# Patient Record
Sex: MALE | Race: WHITE
[De-identification: names, ages, dates, MRNs, and addresses within clinical notes are randomized per-mention and may not be internally consistent; named-entity substitution may affect disease eponyms.]

---

## 2021-04-11 ENCOUNTER — HOSPITAL ENCOUNTER (EMERGENCY)
Dept: HOSPITAL 49 - FER | Age: 61
Discharge: HOME | End: 2021-04-11
Payer: COMMERCIAL

## 2021-04-11 DIAGNOSIS — I10: ICD-10-CM

## 2021-04-11 DIAGNOSIS — M54.41: ICD-10-CM

## 2021-04-11 DIAGNOSIS — M54.42: Primary | ICD-10-CM

## 2021-04-11 DIAGNOSIS — F17.200: ICD-10-CM

## 2021-05-06 ENCOUNTER — HOSPITAL ENCOUNTER (OUTPATIENT)
Dept: HOSPITAL 49 - FAS | Age: 61
Discharge: HOME | End: 2021-05-06
Attending: SURGERY
Payer: COMMERCIAL

## 2021-05-06 VITALS — WEIGHT: 231.99 LBS | BODY MASS INDEX: 28.85 KG/M2 | HEIGHT: 75 IN

## 2021-05-06 DIAGNOSIS — K21.9: ICD-10-CM

## 2021-05-06 DIAGNOSIS — F17.210: ICD-10-CM

## 2021-05-06 DIAGNOSIS — Z79.899: ICD-10-CM

## 2021-05-06 DIAGNOSIS — M19.90: ICD-10-CM

## 2021-05-06 DIAGNOSIS — Z98.890: ICD-10-CM

## 2021-05-06 DIAGNOSIS — Z20.822: ICD-10-CM

## 2021-05-06 DIAGNOSIS — C79.49: Primary | ICD-10-CM

## 2021-05-06 DIAGNOSIS — Z82.49: ICD-10-CM

## 2021-05-06 DIAGNOSIS — I10: ICD-10-CM

## 2021-05-06 DIAGNOSIS — Z91.041: ICD-10-CM

## 2021-05-06 DIAGNOSIS — Z80.0: ICD-10-CM

## 2021-05-06 LAB
BASOPHIL: 0.3 % (ref 0–2)
EOSINOPHIL: 0.3 % (ref 0–5)
HCT: 39.9 % (ref 42–52)
HGB BLD-MCNC: 13.3 G/DL (ref 13.2–18)
INR PPP: 1.04 (ref 0.9–1.2)
LYMPHOCYTE: 12.7 % (ref 15–48)
MCH RBC QN AUTO: 30.5 PG (ref 25–31)
MCHC RBC AUTO-ENTMCNC: 33.3 G/DL (ref 32–36)
MCV: 91.5 FL (ref 78–100)
MONOCYTE: 9.5 % (ref 0–12)
MPV: 8.8 FL (ref 6–9.5)
NEUTROPHIL: 74.1 % (ref 41–80)
NRBC: 0.1
PLT: 417 K/UL (ref 150–400)
PROTHROMBIN TIME: 12.9 SECONDS (ref 11.4–13.6)
PTT: 26.4 SECONDS (ref 22.2–34.7)
RBC MORPHOLOGY: NORMAL
RBC: 4.36 M/UL (ref 4.7–6)
RDW: 13.8 % (ref 11.5–14)
WBC: 17.4 K/UL (ref 4–10.5)

## 2021-05-06 PROCEDURE — C1788 PORT, INDWELLING, IMP: HCPCS

## 2021-08-11 ENCOUNTER — HOSPITAL ENCOUNTER (INPATIENT)
Dept: HOSPITAL 49 - FER | Age: 61
LOS: 8 days | Discharge: NURSING, REHAB OR LONG-TERM CARE | DRG: 193 | End: 2021-08-19
Attending: INTERNAL MEDICINE | Admitting: INTERNAL MEDICINE
Payer: COMMERCIAL

## 2021-08-11 VITALS — HEIGHT: 75 IN | BODY MASS INDEX: 28.63 KG/M2 | WEIGHT: 230.25 LBS

## 2021-08-11 DIAGNOSIS — Z98.890: ICD-10-CM

## 2021-08-11 DIAGNOSIS — C78.7: ICD-10-CM

## 2021-08-11 DIAGNOSIS — K21.9: ICD-10-CM

## 2021-08-11 DIAGNOSIS — Z91.040: ICD-10-CM

## 2021-08-11 DIAGNOSIS — F03.90: ICD-10-CM

## 2021-08-11 DIAGNOSIS — I50.9: ICD-10-CM

## 2021-08-11 DIAGNOSIS — F17.200: ICD-10-CM

## 2021-08-11 DIAGNOSIS — Z90.81: ICD-10-CM

## 2021-08-11 DIAGNOSIS — F05: ICD-10-CM

## 2021-08-11 DIAGNOSIS — G93.41: ICD-10-CM

## 2021-08-11 DIAGNOSIS — E83.52: ICD-10-CM

## 2021-08-11 DIAGNOSIS — I11.0: ICD-10-CM

## 2021-08-11 DIAGNOSIS — C34.90: ICD-10-CM

## 2021-08-11 DIAGNOSIS — Z79.891: ICD-10-CM

## 2021-08-11 DIAGNOSIS — Z79.899: ICD-10-CM

## 2021-08-11 DIAGNOSIS — D64.9: ICD-10-CM

## 2021-08-11 DIAGNOSIS — E78.5: ICD-10-CM

## 2021-08-11 DIAGNOSIS — Z79.890: ICD-10-CM

## 2021-08-11 DIAGNOSIS — R18.0: ICD-10-CM

## 2021-08-11 DIAGNOSIS — Z66: ICD-10-CM

## 2021-08-11 DIAGNOSIS — Z20.822: ICD-10-CM

## 2021-08-11 DIAGNOSIS — J18.9: Primary | ICD-10-CM

## 2021-08-11 DIAGNOSIS — Z98.1: ICD-10-CM

## 2021-08-11 LAB
ALBUMIN SERPL-MCNC: 2.4 G/DL (ref 3.4–5)
ALKALINE PHOSHATASE: 742 U/L (ref 46–116)
ALT SERPL-CCNC: 38 U/L (ref 16–63)
AMORPH URATE CRY #/AREA URNS HPF: (no result) /[HPF]
AST: 147 U/L (ref 15–37)
BASOPHIL: 0.5 % (ref 0–2)
BILIRUBIN - TOTAL: 1.3 MG/DL (ref 0.2–1)
BILIRUBIN: (no result) MG/DL
BLOOD: NEGATIVE ERY/UL
BUN SERPL-MCNC: 11 MG/DL (ref 7–18)
BUN/CREAT RATIO (CALC): 19.3 RATIO
CHLORIDE: 96 MMOL/L (ref 98–107)
CLARITY UR: (no result)
CO2 (BICARBONATE): 27 MMOL/L (ref 21–32)
COLOR: YELLOW
CORONAVIRUS 2019 SARS-COV-2: NEGATIVE
CREATININE: 0.57 MG/DL (ref 0.67–1.17)
EOSINOPHIL: 0 % (ref 0–5)
GLOBULIN (CALCULATION): 3.8 G/DL
GLUCOSE (U): NORMAL MG/DL
GLUCOSE SERPL-MCNC: 78 MG/DL (ref 74–106)
GRAN CASTS #/AREA URNS LPF: (no result) /[LPF]
HCT: 26.9 % (ref 42–52)
HGB BLD-MCNC: 8.5 G/DL (ref 13.2–18)
INFLUENZA A NAA: NEGATIVE
LACTIC ACID: 1.7 MMOL/L (ref 0.4–1.9)
LEUKOCYTES: NEGATIVE LEU/UL
LYMPHOCYTE: 6.7 % (ref 15–48)
MCH RBC QN AUTO: 29.5 PG (ref 25–31)
MCHC RBC AUTO-ENTMCNC: 31.6 G/DL (ref 32–36)
MCV: 93.4 FL (ref 78–100)
MONOCYTE: 14.7 % (ref 0–12)
MPV: 8.8 FL (ref 6–9.5)
NEUTROPHIL: 67.3 % (ref 41–80)
NITRITE: NEGATIVE MG/DL
NRBC: 1
PLT: 398 K/UL (ref 150–400)
POTASSIUM: 4 MMOL/L (ref 3.5–5.1)
PROTEIN: (no result) MG/DL
RBC MORPHOLOGY: (no result)
RBC: 2.88 M/UL (ref 4.7–6)
RDW: 24.2 % (ref 11.5–14)
SPECIFIC GRAVITY: 1.02 (ref 1–1.03)
TOTAL PROTEIN: 6.2 G/DL (ref 6.4–8.2)
TRIPLE PHOSPHATE CRYSTALS: (no result)
URINARY RBC: (no result)
UROBILINOGEN: 1 MG/DL (ref 0.2–1)
WBC: 20.2 K/UL (ref 4–10.5)

## 2021-08-11 PROCEDURE — U0002 COVID-19 LAB TEST NON-CDC: HCPCS

## 2021-08-12 LAB
BASOPHIL: 0.5 % (ref 0–2)
BUN SERPL-MCNC: 15 MG/DL (ref 7–18)
BUN/CREAT RATIO (CALC): 23.1 RATIO
CHLORIDE: 97 MMOL/L (ref 98–107)
CO2 (BICARBONATE): 28 MMOL/L (ref 21–32)
CREATININE: 0.65 MG/DL (ref 0.67–1.17)
EOSINOPHIL: 0 % (ref 0–5)
FOLIC ACID (SERUM): 6.6 NG/ML (ref 8.6–58.9)
GLUCOSE SERPL-MCNC: 62 MG/DL (ref 74–106)
HCT: 28 % (ref 42–52)
HGB BLD-MCNC: 8.8 G/DL (ref 13.2–18)
IRON % SATURATION: 43.7 %SAT (ref 20–50)
IRON SERPL-MCNC: 83 UG/DL (ref 65–175)
LYMPHOCYTE: 6.5 % (ref 15–48)
MCH RBC QN AUTO: 29.7 PG (ref 25–31)
MCHC RBC AUTO-ENTMCNC: 31.4 G/DL (ref 32–36)
MCV: 94.6 FL (ref 78–100)
MONOCYTE: 13.3 % (ref 0–12)
MPV: 8.9 FL (ref 6–9.5)
NEUTROPHIL: 69.3 % (ref 41–80)
NRBC: 1.3
PLT: 375 K/UL (ref 150–400)
POTASSIUM: 3.6 MMOL/L (ref 3.5–5.1)
RBC MORPHOLOGY: (no result)
RBC: 2.96 M/UL (ref 4.7–6)
RDW: 24.4 % (ref 11.5–14)
RETICULOCYTE COUNT: 2.7 % (ref 1–2)
WBC: 20.2 K/UL (ref 4–10.5)

## 2021-08-12 NOTE — NUR
8/12/21 Mr. Leone lives at home with his spouse. They do not have children
nor any family that live locally. [facility]. He is currently receiving short term disability from [facility] and will move to long term disability in October. - Family has been
advised to discuss eligibility for SSD with the [employer]. - Mr. Leone will need a
rw, 3in1, and likely 02 at discharge as well as HH. They chose [facility],
affliation undertood. A referral was made to [facility].

## 2021-08-13 LAB
ALBUMIN SERPL-MCNC: 2.3 G/DL (ref 3.4–5)
ALKALINE PHOSHATASE: 710 U/L (ref 46–116)
ALT SERPL-CCNC: 47 U/L (ref 16–63)
AST: 180 U/L (ref 15–37)
BASOPHIL: 0.3 % (ref 0–2)
BILIRUBIN - TOTAL: 1.4 MG/DL (ref 0.2–1)
BUN SERPL-MCNC: 16 MG/DL (ref 7–18)
BUN/CREAT RATIO (CALC): 27.1 RATIO
CHLORIDE: 97 MMOL/L (ref 98–107)
CO2 (BICARBONATE): 30 MMOL/L (ref 21–32)
CREATININE: 0.59 MG/DL (ref 0.67–1.17)
EOSINOPHIL: 0 % (ref 0–5)
GLOBULIN (CALCULATION): 3.7 G/DL
GLUCOSE SERPL-MCNC: 78 MG/DL (ref 74–106)
HCT: 26.5 % (ref 42–52)
HGB BLD-MCNC: 8.2 G/DL (ref 13.2–18)
LYMPHOCYTE: 7.1 % (ref 15–48)
MCH RBC QN AUTO: 29.1 PG (ref 25–31)
MCHC RBC AUTO-ENTMCNC: 30.9 G/DL (ref 32–36)
MCV: 94 FL (ref 78–100)
MONOCYTE: 11.1 % (ref 0–12)
MPV: 9.4 FL (ref 6–9.5)
NEUTROPHIL: 72.1 % (ref 41–80)
NRBC: 1.6
PLT: 373 K/UL (ref 150–400)
POTASSIUM: 3.5 MMOL/L (ref 3.5–5.1)
RBC MORPHOLOGY: (no result)
RBC: 2.82 M/UL (ref 4.7–6)
RDW: 24.5 % (ref 11.5–14)
TOTAL PROTEIN: 6 G/DL (ref 6.4–8.2)
WBC: 21.4 K/UL (ref 4–10.5)

## 2021-08-14 LAB
ALBUMIN SERPL-MCNC: 2.2 G/DL (ref 3.4–5)
ALKALINE PHOSHATASE: 698 U/L (ref 46–116)
ALT SERPL-CCNC: 51 U/L (ref 16–63)
AST: 204 U/L (ref 15–37)
BASOPHIL: 0.3 % (ref 0–2)
BILIRUBIN - TOTAL: 1.4 MG/DL (ref 0.2–1)
BUN SERPL-MCNC: 20 MG/DL (ref 7–18)
BUN/CREAT RATIO (CALC): 27.8 RATIO
CHLORIDE: 101 MMOL/L (ref 98–107)
CO2 (BICARBONATE): 25 MMOL/L (ref 21–32)
CREATININE: 0.72 MG/DL (ref 0.67–1.17)
EOSINOPHIL: 0 % (ref 0–5)
FT4 (FREE T4): 1.4 NG/DL (ref 0.76–1.46)
GLOBULIN (CALCULATION): 3.1 G/DL
GLUCOSE SERPL-MCNC: 72 MG/DL (ref 74–106)
HCT: 26.2 % (ref 42–52)
HGB BLD-MCNC: 8.2 G/DL (ref 13.2–18)
LYMPHOCYTE: 5.1 % (ref 15–48)
MCH RBC QN AUTO: 29.7 PG (ref 25–31)
MCHC RBC AUTO-ENTMCNC: 31.3 G/DL (ref 32–36)
MCV: 94.9 FL (ref 78–100)
MONOCYTE: 9.1 % (ref 0–12)
MPV: 8.9 FL (ref 6–9.5)
NEUTROPHIL: 77.3 % (ref 41–80)
NRBC: 1.8
PLT: 321 K/UL (ref 150–400)
POTASSIUM: 3.3 MMOL/L (ref 3.5–5.1)
RBC MORPHOLOGY: (no result)
RBC: 2.76 M/UL (ref 4.7–6)
RDW: 25.3 % (ref 11.5–14)
TOTAL PROTEIN: 5.3 G/DL (ref 6.4–8.2)
URIC ACID: 9.7 MG/DL (ref 3.5–7.2)
WBC: 23.3 K/UL (ref 4–10.5)

## 2021-08-14 NOTE — NUR
8/13/21  2210 PT FOUND WITH O2 OFF AND ALL LEADS FOR TELE OFF AND PORT A CATH
WAS DEACCESSED PER PT AND FOUND IN BED O2 SAT 88 CALLED HOUSESUPERVISOR
FOR NEED OF 1 TO 1 SITTER FOR SAFETY ALL MEDICAL EQUIPMENT REAPPIED AND PORT
A CATH REACCESSED WITH GREAT BLOOD RETURNED PT WILL HAVE 1 TO 1 CARE AT 2330

## 2021-08-15 LAB
ALBUMIN SERPL-MCNC: 2.1 G/DL (ref 3.4–5)
ALKALINE PHOSHATASE: 667 U/L (ref 46–116)
ALT SERPL-CCNC: 59 U/L (ref 16–63)
AST: 252 U/L (ref 15–37)
BASOPHIL: 0.3 % (ref 0–2)
BILIRUBIN - TOTAL: 1.3 MG/DL (ref 0.2–1)
BUN SERPL-MCNC: 21 MG/DL (ref 7–18)
BUN/CREAT RATIO (CALC): 31.3 RATIO
CHLORIDE: 100 MMOL/L (ref 98–107)
CO2 (BICARBONATE): 28 MMOL/L (ref 21–32)
CREATININE: 0.67 MG/DL (ref 0.67–1.17)
EOSINOPHIL: 0 % (ref 0–5)
GLOBULIN (CALCULATION): 3.5 G/DL
GLUCOSE SERPL-MCNC: 74 MG/DL (ref 74–106)
HCT: 26.1 % (ref 42–52)
HGB BLD-MCNC: 8.2 G/DL (ref 13.2–18)
LYMPHOCYTE: 4.6 % (ref 15–48)
MCH RBC QN AUTO: 29.7 PG (ref 25–31)
MCHC RBC AUTO-ENTMCNC: 31.4 G/DL (ref 32–36)
MCV: 94.6 FL (ref 78–100)
MONOCYTE: 5.8 % (ref 0–12)
MPV: 8.5 FL (ref 6–9.5)
NEUTROPHIL: 81.7 % (ref 41–80)
NRBC: 3
PLT: 266 K/UL (ref 150–400)
POTASSIUM: 3.1 MMOL/L (ref 3.5–5.1)
RBC MORPHOLOGY: (no result)
RBC: 2.76 M/UL (ref 4.7–6)
RDW: 25.3 % (ref 11.5–14)
TOTAL PROTEIN: 5.6 G/DL (ref 6.4–8.2)
WBC: 23.6 K/UL (ref 4–10.5)

## 2021-08-16 LAB
ALBUMIN SERPL-MCNC: 2.1 G/DL (ref 3.4–5)
ALKALINE PHOSHATASE: 764 U/L (ref 46–116)
ALT SERPL-CCNC: 56 U/L (ref 16–63)
AST: 321 U/L (ref 15–37)
BASOPHIL: 0.3 % (ref 0–2)
BILIRUBIN - TOTAL: 1.1 MG/DL (ref 0.2–1)
BUN SERPL-MCNC: 18 MG/DL (ref 7–18)
BUN/CREAT RATIO (CALC): 30.5 RATIO
CHLORIDE: 99 MMOL/L (ref 98–107)
CO2 (BICARBONATE): 28 MMOL/L (ref 21–32)
CREATININE: 0.59 MG/DL (ref 0.67–1.17)
EOSINOPHIL: 0 % (ref 0–5)
GLOBULIN (CALCULATION): 3.5 G/DL
GLUCOSE SERPL-MCNC: 83 MG/DL (ref 74–106)
HCT: 26.6 % (ref 42–52)
HGB BLD-MCNC: 8.3 G/DL (ref 13.2–18)
LYMPHOCYTE: 4.1 % (ref 15–48)
MCH RBC QN AUTO: 30 PG (ref 25–31)
MCHC RBC AUTO-ENTMCNC: 31.2 G/DL (ref 32–36)
MCV: 96 FL (ref 78–100)
MONOCYTE: 5.6 % (ref 0–12)
MPV: 9.2 FL (ref 6–9.5)
NEUTROPHIL: 82.9 % (ref 41–80)
NRBC: 2.8
PLT: 261 K/UL (ref 150–400)
POTASSIUM: 3.3 MMOL/L (ref 3.5–5.1)
RBC MORPHOLOGY: (no result)
RBC: 2.77 M/UL (ref 4.7–6)
RDW: 25.7 % (ref 11.5–14)
TOTAL PROTEIN: 5.6 G/DL (ref 6.4–8.2)
WBC: 24 K/UL (ref 4–10.5)

## 2021-08-16 NOTE — NUR
8/16/21 Mr. Leone is nearing being ready for discharge. His spouse chose
[facility], [facility]. Referrals have been sent to
each facility.

## 2021-08-17 LAB
ALBUMIN SERPL-MCNC: 2.1 G/DL (ref 3.4–5)
ALKALINE PHOSHATASE: 860 U/L (ref 46–116)
ALT SERPL-CCNC: 106 U/L (ref 16–63)
AST: 525 U/L (ref 15–37)
BASOPHIL: 0.4 % (ref 0–2)
BILIRUBIN - TOTAL: 2.1 MG/DL (ref 0.2–1)
BUN SERPL-MCNC: 20 MG/DL (ref 7–18)
BUN/CREAT RATIO (CALC): 35.7 RATIO
CHLORIDE: 99 MMOL/L (ref 98–107)
CO2 (BICARBONATE): 24 MMOL/L (ref 21–32)
CREATININE: 0.56 MG/DL (ref 0.67–1.17)
EOSINOPHIL: 0.1 % (ref 0–5)
GLOBULIN (CALCULATION): 3.5 G/DL
GLUCOSE SERPL-MCNC: 64 MG/DL (ref 74–106)
HCT: 26.5 % (ref 42–52)
HGB BLD-MCNC: 8.4 G/DL (ref 13.2–18)
LYMPHOCYTE: 4.2 % (ref 15–48)
MCH RBC QN AUTO: 30.2 PG (ref 25–31)
MCHC RBC AUTO-ENTMCNC: 31.7 G/DL (ref 32–36)
MCV: 95.3 FL (ref 78–100)
MONOCYTE: 6.8 % (ref 0–12)
MPV: 9 FL (ref 6–9.5)
NEUTROPHIL: 80.3 % (ref 41–80)
NRBC: 2.9
PLT: 230 K/UL (ref 150–400)
POTASSIUM: 4 MMOL/L (ref 3.5–5.1)
RBC MORPHOLOGY: NORMAL
RBC: 2.78 M/UL (ref 4.7–6)
RDW: 26.2 % (ref 11.5–14)
TOTAL PROTEIN: 5.6 G/DL (ref 6.4–8.2)
WBC: 22.4 K/UL (ref 4–10.5)

## 2021-08-18 NOTE — NUR
8/18/21 [facility] has accepted Mr. Leone pending insurance approval. Report
given to MS DARYN Woods.

## 2021-08-19 ENCOUNTER — HOSPITAL ENCOUNTER (INPATIENT)
Dept: HOSPITAL 49 - FER | Age: 61
LOS: 7 days | DRG: 441 | End: 2021-08-26
Attending: INTERNAL MEDICINE | Admitting: INTERNAL MEDICINE
Payer: COMMERCIAL

## 2021-08-19 VITALS — WEIGHT: 251.12 LBS | HEIGHT: 75 IN | BODY MASS INDEX: 31.22 KG/M2

## 2021-08-19 DIAGNOSIS — J96.01: ICD-10-CM

## 2021-08-19 DIAGNOSIS — Z20.822: ICD-10-CM

## 2021-08-19 DIAGNOSIS — Z87.01: ICD-10-CM

## 2021-08-19 DIAGNOSIS — Z51.5: ICD-10-CM

## 2021-08-19 DIAGNOSIS — K72.90: Primary | ICD-10-CM

## 2021-08-19 DIAGNOSIS — Z90.81: ICD-10-CM

## 2021-08-19 DIAGNOSIS — D63.8: ICD-10-CM

## 2021-08-19 DIAGNOSIS — Z66: ICD-10-CM

## 2021-08-19 DIAGNOSIS — I11.0: ICD-10-CM

## 2021-08-19 DIAGNOSIS — Z92.21: ICD-10-CM

## 2021-08-19 DIAGNOSIS — C34.90: ICD-10-CM

## 2021-08-19 DIAGNOSIS — R18.8: ICD-10-CM

## 2021-08-19 DIAGNOSIS — I47.2: ICD-10-CM

## 2021-08-19 DIAGNOSIS — Z87.891: ICD-10-CM

## 2021-08-19 DIAGNOSIS — Z79.82: ICD-10-CM

## 2021-08-19 DIAGNOSIS — I50.33: ICD-10-CM

## 2021-08-19 DIAGNOSIS — Z79.51: ICD-10-CM

## 2021-08-19 DIAGNOSIS — J18.9: ICD-10-CM

## 2021-08-19 DIAGNOSIS — Z92.3: ICD-10-CM

## 2021-08-19 DIAGNOSIS — C78.7: ICD-10-CM

## 2021-08-19 DIAGNOSIS — Z79.899: ICD-10-CM

## 2021-08-19 DIAGNOSIS — R33.9: ICD-10-CM

## 2021-08-19 DIAGNOSIS — G89.29: ICD-10-CM

## 2021-08-19 DIAGNOSIS — G93.41: ICD-10-CM

## 2021-08-19 DIAGNOSIS — K56.7: ICD-10-CM

## 2021-08-19 DIAGNOSIS — Z91.041: ICD-10-CM

## 2021-08-19 DIAGNOSIS — Z98.1: ICD-10-CM

## 2021-08-19 DIAGNOSIS — Z79.891: ICD-10-CM

## 2021-08-19 DIAGNOSIS — E78.5: ICD-10-CM

## 2021-08-19 DIAGNOSIS — M84.48XA: ICD-10-CM

## 2021-08-19 DIAGNOSIS — Z88.8: ICD-10-CM

## 2021-08-19 LAB
BASOPHIL: 0.4 % (ref 0–2)
BUN SERPL-MCNC: 19 MG/DL (ref 7–18)
BUN/CREAT RATIO (CALC): 34.5 RATIO
CHLORIDE: 102 MMOL/L (ref 98–107)
CO2 (BICARBONATE): 26 MMOL/L (ref 21–32)
CREATININE: 0.55 MG/DL (ref 0.67–1.17)
EOSINOPHIL: 0 % (ref 0–5)
GLUCOSE SERPL-MCNC: 85 MG/DL (ref 74–106)
HCT: 25.3 % (ref 42–52)
HGB BLD-MCNC: 8.2 G/DL (ref 13.2–18)
LYMPHOCYTE: 5.4 % (ref 15–48)
MCH RBC QN AUTO: 30.3 PG (ref 25–31)
MCHC RBC AUTO-ENTMCNC: 32.4 G/DL (ref 32–36)
MCV: 93.4 FL (ref 78–100)
MONOCYTE: 8.3 % (ref 0–12)
MPV: 8.9 FL (ref 6–9.5)
NEUTROPHIL: 74.8 % (ref 41–80)
NRBC: 5.5
PLT: 210 K/UL (ref 150–400)
POTASSIUM: 4.3 MMOL/L (ref 3.5–5.1)
RBC MORPHOLOGY: (no result)
RBC: 2.71 M/UL (ref 4.7–6)
RDW: 26.5 % (ref 11.5–14)
WBC: 24.3 K/UL (ref 4–10.5)

## 2021-08-19 PROCEDURE — P9046 ALBUMIN (HUMAN), 25%, 20 ML: HCPCS

## 2021-08-19 PROCEDURE — C9113 INJ PANTOPRAZOLE SODIUM, VIA: HCPCS

## 2021-08-19 PROCEDURE — U0002 COVID-19 LAB TEST NON-CDC: HCPCS

## 2021-08-19 NOTE — NUR
8/19/21 [facility] has accept Mr. Leone for dmission today. Patient meets
criteria for EMS transport per Dr. Patton. Report given to Dora, MS Charge RN.

## 2021-08-20 LAB
ALBUMIN SERPL-MCNC: 1.9 G/DL (ref 3.4–5)
ALKALINE PHOSHATASE: 744 U/L (ref 46–116)
ALT SERPL-CCNC: 100 U/L (ref 16–63)
AMORPHOUS PHOSPHATE CRYSTALS: (no result)
AST: 486 U/L (ref 15–37)
BASOPHIL: 0.6 % (ref 0–2)
BILIRUBIN - TOTAL: 2.2 MG/DL (ref 0.2–1)
BILIRUBIN: (no result) MG/DL
BLOOD: NEGATIVE ERY/UL
BUN SERPL-MCNC: 21 MG/DL (ref 7–18)
BUN/CREAT RATIO (CALC): 35 RATIO
CHLORIDE: 101 MMOL/L (ref 98–107)
CLARITY UR: (no result)
CO2 (BICARBONATE): 26 MMOL/L (ref 21–32)
COLOR: YELLOW
CREATININE: 0.6 MG/DL (ref 0.67–1.17)
EOSINOPHIL: 0 % (ref 0–5)
GLOBULIN (CALCULATION): 3.7 G/DL
GLUCOSE (U): NORMAL MG/DL
GLUCOSE SERPL-MCNC: 91 MG/DL (ref 74–106)
GRAN CASTS #/AREA URNS LPF: (no result) /[LPF]
HCT: 26 % (ref 42–52)
HGB BLD-MCNC: 8.5 G/DL (ref 13.2–18)
LACTIC ACID: 2.8 MMOL/L (ref 0.4–1.9)
LEUKOCYTES: NEGATIVE LEU/UL
LYMPHOCYTE: 5.5 % (ref 15–48)
MCH RBC QN AUTO: 30 PG (ref 25–31)
MCHC RBC AUTO-ENTMCNC: 32.7 G/DL (ref 32–36)
MCV: 91.9 FL (ref 78–100)
MONOCYTE: 8.1 % (ref 0–12)
MPV: 9.6 FL (ref 6–9.5)
NEUTROPHIL: 70.9 % (ref 41–80)
NITRITE: NEGATIVE MG/DL
NRBC: 7.5
PLT: 213 K/UL (ref 150–400)
POTASSIUM: 4.2 MMOL/L (ref 3.5–5.1)
PROTEIN: (no result) MG/DL
RBC MORPHOLOGY: (no result)
RBC: 2.83 M/UL (ref 4.7–6)
RDW: 26.5 % (ref 11.5–14)
SPECIFIC GRAVITY: 1.01 (ref 1–1.03)
SQUAMOUS EPITHELIAL CELLS: (no result)
TOTAL PROTEIN: 5.6 G/DL (ref 6.4–8.2)
URINARY RBC: (no result)
URINARY WBC: (no result)
UROBILINOGEN: 0.2 MG/DL (ref 0.2–1)
WBC: 25.1 K/UL (ref 4–10.5)

## 2021-08-20 NOTE — NUR
PT HAD A 26 OR MORE BEAT OF VTACH AT 1702 HEART RATE GOT AS HIGH  BEATS
PER MINUTE SHOWN ON MONITOR
DR SANTIAGO CAME INTO TH ROOM AND ASSESSED PATIENT DID A CAROTID MASSAGE
5MG OF IV LOPRESSOR WAS GIVEN BY GEOFF STEARNS
PT RETURNED TO NORMAL SINUS RHYTHM AT 91 BEATS PER MINUTE

## 2021-08-20 NOTE — NUR
8/20/21 Mr. Leone was discharged to [facility] on 8/19/21. Insurance approved
patient's admission at [facility] through 8/25. [facility] will consider return
after clinical approval.

## 2021-08-21 LAB
ALBUMIN SERPL-MCNC: 1.8 G/DL (ref 3.4–5)
ALKALINE PHOSHATASE: 683 U/L (ref 46–116)
ALT SERPL-CCNC: 88 U/L (ref 16–63)
AST: 404 U/L (ref 15–37)
BASOPHIL: 0.1 % (ref 0–2)
BILIRUBIN - TOTAL: 2.4 MG/DL (ref 0.2–1)
BUN SERPL-MCNC: 20 MG/DL (ref 7–18)
BUN/CREAT RATIO (CALC): 28.6 RATIO
CHLORIDE: 104 MMOL/L (ref 98–107)
CO2 (BICARBONATE): 28 MMOL/L (ref 21–32)
CREATININE: 0.7 MG/DL (ref 0.67–1.17)
EOSINOPHIL: 0.1 % (ref 0–5)
GLOBULIN (CALCULATION): 3.7 G/DL
GLUCOSE SERPL-MCNC: 75 MG/DL (ref 74–106)
HCT: 25.9 % (ref 42–52)
HGB BLD-MCNC: 8.3 G/DL (ref 13.2–18)
LYMPHOCYTE: 3 % (ref 15–48)
MAGNESIUM SERPL-MCNC: 2.1 MG/DL (ref 1.8–2.4)
MCH RBC QN AUTO: 30.2 PG (ref 25–31)
MCHC RBC AUTO-ENTMCNC: 32 G/DL (ref 32–36)
MCV: 94.2 FL (ref 78–100)
MONOCYTE: 8.8 % (ref 0–12)
MPV: 9.1 FL (ref 6–9.5)
NEUTROPHIL: 70.7 % (ref 41–80)
NRBC: 10.9
PLT: 199 K/UL (ref 150–400)
POTASSIUM: 3.9 MMOL/L (ref 3.5–5.1)
RBC MORPHOLOGY: NORMAL
RBC: 2.75 M/UL (ref 4.7–6)
RDW: 27.5 % (ref 11.5–14)
TOTAL PROTEIN: 5.5 G/DL (ref 6.4–8.2)
WBC: 24.7 K/UL (ref 4–10.5)

## 2021-08-21 NOTE — NUR
PT HR WENT UP  AND SUSTAINED FOR A MINUTE. APRN NOTIFIED AND EKG WAS
GOTTEN BUT PT HAD ALREADY COME OUT OF RHYTHM. APRN AT BEDSIDE AT THIS TIME. NO
NEW ORDERS. NIGHTLY MEDICATIONS WERE GIVEN

## 2021-08-22 LAB
ALBUMIN SERPL-MCNC: 1.8 G/DL (ref 3.4–5)
ALKALINE PHOSHATASE: 663 U/L (ref 46–116)
ALT SERPL-CCNC: 80 U/L (ref 16–63)
AST: 404 U/L (ref 15–37)
BASOPHIL: 0.1 % (ref 0–2)
BILIRUBIN - TOTAL: 3 MG/DL (ref 0.2–1)
BUN SERPL-MCNC: 25 MG/DL (ref 7–18)
BUN/CREAT RATIO (CALC): 29.4 RATIO
CHLORIDE: 104 MMOL/L (ref 98–107)
CO2 (BICARBONATE): 27 MMOL/L (ref 21–32)
CREATININE: 0.85 MG/DL (ref 0.67–1.17)
EOSINOPHIL: 0.2 & (ref 0–5)
GLOBULIN (CALCULATION): 2.9 G/DL
GLUCOSE SERPL-MCNC: 78 MG/DL (ref 74–106)
HCT: 23.8 % (ref 42–52)
HGB BLD-MCNC: 7.9 G/DL (ref 13.2–18)
INR PPP: 1.33 (ref 0.9–1.2)
LYMPHOCYTE: 19 % (ref 15–48)
MCH RBC QN AUTO: 30.7 PG (ref 25–31)
MCHC RBC AUTO-ENTMCNC: 33.2 G/DL (ref 32–36)
MCV: 92.6 FL (ref 78–100)
MONOCYTE: 7.5 % (ref 0–12)
MPV: 9.5 FL (ref 6–9.5)
NEUTROPHIL: 57.3 % (ref 41–80)
PLT: 185 K/UL (ref 150–400)
POTASSIUM: 3.3 MMOL/L (ref 3.5–5.1)
PROTHROMBIN TIME: 15.8 SECONDS (ref 11.8–13.4)
RBC MORPHOLOGY: NORMAL
RBC: 2.57 M/UL (ref 4.7–6)
RDW: 26.9 % (ref 11.5–14)
TOTAL PROTEIN: 4.7 G/DL (ref 6.4–8.2)
WBC: 26.56 K/UL (ref 4–10.5)

## 2021-08-22 NOTE — NUR
APRN NOTIFIED OF AMIO GTT INFILTRATION. ICE PACK APPLIED TO IV SITE AFTER IV
REMOVED. NO NEW ORDERS AT THIS TIME

## 2021-08-22 NOTE — NUR
PT LEFT AC IV WAS INFUSING AMIO GTT. SITE BECAME RED, HARD AND PAINFUL TO
TOUCH. APRN NOTIFIED, IV REMOVED AND AMIO MOVED TO ANOTHER SITE. APRN TO
FOLLOW UP. WILL CONTINUE TO MONITOR

## 2021-08-22 NOTE — NUR
INFORMED DR. SANTIAGO THAT PATIENT COMPLAINED OF NAUSEA, CHEST PRESSURE AND HIS
ARMS AND LEGS WERE BURNING. NO SVT NOTED DURING EPISODE. ALL VITAL SIGNS
STABLE. BREATHING LABORED, BUT THIS IS UNCHANGED FROM PREVIOUS ASSESSMENT. NO
NEW ORDERS OBTAINED.

## 2021-08-23 LAB
ALBUMIN SERPL-MCNC: 2.2 G/DL (ref 3.4–5)
ALKALINE PHOSHATASE: 623 U/L (ref 46–116)
ALT SERPL-CCNC: 63 U/L (ref 16–63)
AST: 358 U/L (ref 15–37)
BASOPHIL: 0.1 % (ref 0–2)
BILIRUBIN - TOTAL: 2.7 MG/DL (ref 0.2–1)
BUN SERPL-MCNC: 27 MG/DL (ref 7–18)
BUN/CREAT RATIO (CALC): 29.3 RATIO
CHLORIDE: 102 MMOL/L (ref 98–107)
CO2 (BICARBONATE): 23 MMOL/L (ref 21–32)
CREATININE: 0.92 MG/DL (ref 0.67–1.17)
EOSINOPHIL: 0.4 & (ref 0–5)
GLOBULIN (CALCULATION): 2.8 G/DL
GLUCOSE SERPL-MCNC: 82 MG/DL (ref 74–106)
HCT: 24.3 % (ref 42–52)
HGB BLD-MCNC: 7.6 G/DL (ref 13.2–18)
LYMPHOCYTE: 18.7 % (ref 15–48)
MAGNESIUM SERPL-MCNC: 1.9 MG/DL (ref 1.8–2.4)
MCH RBC QN AUTO: 30.2 PG (ref 25–31)
MCHC RBC AUTO-ENTMCNC: 31.3 G/DL (ref 32–36)
MCV: 96.4 FL (ref 78–100)
MONOCYTE: 6.3 % (ref 0–12)
MPV: 9.5 FL (ref 6–9.5)
NEUTROPHIL: 58.8 % (ref 41–80)
PLT: 168 K/UL (ref 150–400)
POTASSIUM: 3.5 MMOL/L (ref 3.5–5.1)
RBC MORPHOLOGY: NORMAL
RBC: 2.52 M/UL (ref 4.7–6)
RDW: 27.8 % (ref 11.5–14)
TOTAL PROTEIN: 5 G/DL (ref 6.4–8.2)
WBC: 27.16 K/UL (ref 4–10.5)

## 2021-08-23 NOTE — NUR
0415-upon arrival to
do pt's vitals, pt is asleep; periods of apnea; pulse ox show
70% on 2 liters.
0417- 4 liters sats 78%
0420-called RN to room and placed on 40% venti mask; up to 80%
0425-50% venti mask 83% glucose poc 78; PA to place order for 1/2 amp d50
0430-nonrebreather; PA and respiratory at bedside; we did switch out pulse ox
and sats improved; will keep on nonrebreather while sleeping;

## 2021-08-24 LAB
ALBUMIN SERPL-MCNC: 2.6 G/DL (ref 3.4–5)
ALKALINE PHOSHATASE: 545 U/L (ref 46–116)
ALT SERPL-CCNC: 66 U/L (ref 16–63)
AST: 362 U/L (ref 15–37)
BASOPHIL: 0.5 % (ref 0–2)
BILIRUBIN - TOTAL: 3.2 MG/DL (ref 0.2–1)
BUN SERPL-MCNC: 29 MG/DL (ref 7–18)
BUN/CREAT RATIO (CALC): 28.7 RATIO
CHLORIDE: 100 MMOL/L (ref 98–107)
CO2 (BICARBONATE): 26 MMOL/L (ref 21–32)
CREATININE: 1.01 MG/DL (ref 0.67–1.17)
EOSINOPHIL: 0.5 % (ref 0–5)
GLOBULIN (CALCULATION): 3.1 G/DL
GLUCOSE SERPL-MCNC: 71 MG/DL (ref 74–106)
HCT: 22.5 % (ref 42–52)
HGB BLD-MCNC: 7.2 G/DL (ref 13.2–18)
LYMPHOCYTE: 4.8 % (ref 15–48)
MCH RBC QN AUTO: 30.8 PG (ref 25–31)
MCHC RBC AUTO-ENTMCNC: 32 G/DL (ref 32–36)
MCV: 96.2 FL (ref 78–100)
MONOCYTE: 6.2 % (ref 0–12)
MPV: 9.7 FL (ref 6–9.5)
NEUTROPHIL: 71.5 % (ref 41–80)
NRBC: 17.8
PLT: 140 K/UL (ref 150–400)
POTASSIUM: 3.1 MMOL/L (ref 3.5–5.1)
RBC MORPHOLOGY: NORMAL
RBC: 2.34 M/UL (ref 4.7–6)
RDW: 28.4 % (ref 11.5–14)
TOTAL PROTEIN: 5.7 G/DL (ref 6.4–8.2)
WBC: 21.9 K/UL (ref 4–10.5)

## 2021-08-25 LAB
ALBUMIN SERPL-MCNC: 2.6 G/DL (ref 3.4–5)
ALKALINE PHOSHATASE: 574 U/L (ref 46–116)
ALT SERPL-CCNC: 82 U/L (ref 16–63)
AST: 495 U/L (ref 15–37)
BASOPHIL: 0.6 % (ref 0–2)
BILIRUBIN - TOTAL: 4 MG/DL (ref 0.2–1)
BUN SERPL-MCNC: 34 MG/DL (ref 7–18)
BUN/CREAT RATIO (CALC): 28.3 RATIO
CHLORIDE: 100 MMOL/L (ref 98–107)
CO2 (BICARBONATE): 21 MMOL/L (ref 21–32)
CREATININE: 1.2 MG/DL (ref 0.67–1.17)
EOSINOPHIL: 0.6 % (ref 0–5)
GLOBULIN (CALCULATION): 3.3 G/DL
GLUCOSE SERPL-MCNC: 49 MG/DL (ref 74–106)
HCT: 25.3 % (ref 42–52)
HGB BLD-MCNC: 7.9 G/DL (ref 13.2–18)
LYMPHOCYTE: 5.4 % (ref 15–48)
MCH RBC QN AUTO: 30.3 PG (ref 25–31)
MCHC RBC AUTO-ENTMCNC: 31.2 G/DL (ref 32–36)
MCV: 96.9 FL (ref 78–100)
MONOCYTE: 5.6 % (ref 0–12)
MPV: 10.5 FL (ref 6–9.5)
NEUTROPHIL: 73.1 % (ref 41–80)
NRBC: 18.2
PLT: 158 K/UL (ref 150–400)
POTASSIUM: 3.8 MMOL/L (ref 3.5–5.1)
RBC MORPHOLOGY: NORMAL
RBC: 2.61 M/UL (ref 4.7–6)
RDW: 29.2 % (ref 11.5–14)
TOTAL PROTEIN: 5.9 G/DL (ref 6.4–8.2)
WBC: 22.7 K/UL (ref 4–10.5)